# Patient Record
Sex: FEMALE | NOT HISPANIC OR LATINO
[De-identification: names, ages, dates, MRNs, and addresses within clinical notes are randomized per-mention and may not be internally consistent; named-entity substitution may affect disease eponyms.]

---

## 2018-03-20 ENCOUNTER — FORM ENCOUNTER (OUTPATIENT)
Age: 53
End: 2018-03-20

## 2018-12-26 ENCOUNTER — FORM ENCOUNTER (OUTPATIENT)
Age: 53
End: 2018-12-26

## 2019-03-12 ENCOUNTER — FORM ENCOUNTER (OUTPATIENT)
Age: 54
End: 2019-03-12

## 2020-03-12 ENCOUNTER — FORM ENCOUNTER (OUTPATIENT)
Age: 55
End: 2020-03-12

## 2020-04-28 ENCOUNTER — FORM ENCOUNTER (OUTPATIENT)
Age: 55
End: 2020-04-28

## 2020-09-22 ENCOUNTER — FORM ENCOUNTER (OUTPATIENT)
Age: 55
End: 2020-09-22

## 2021-01-19 PROBLEM — Z00.00 ENCOUNTER FOR PREVENTIVE HEALTH EXAMINATION: Status: ACTIVE | Noted: 2021-01-19

## 2021-03-19 ENCOUNTER — APPOINTMENT (OUTPATIENT)
Dept: BREAST CENTER | Facility: CLINIC | Age: 56
End: 2021-03-19

## 2021-07-23 PROBLEM — Z98.82 BREAST IMPLANT STATUS: Status: RESOLVED | Noted: 2021-01-21 | Resolved: 2021-07-23

## 2021-07-23 PROBLEM — Z86.39 HISTORY OF HYPOTHYROIDISM: Status: RESOLVED | Noted: 2021-07-23 | Resolved: 2021-07-23

## 2021-07-23 PROBLEM — N63.0 PALPABLE MASS OF BREAST: Status: RESOLVED | Noted: 2021-01-21 | Resolved: 2021-07-23

## 2021-07-23 PROBLEM — Z86.39 HISTORY OF HASHIMOTO THYROIDITIS: Status: RESOLVED | Noted: 2021-07-23 | Resolved: 2021-07-23

## 2021-07-23 RX ORDER — LEVOTHYROXINE SODIUM 137 UG/1
TABLET ORAL
Refills: 0 | Status: ACTIVE | COMMUNITY

## 2021-07-26 ENCOUNTER — APPOINTMENT (OUTPATIENT)
Dept: BREAST CENTER | Facility: CLINIC | Age: 56
End: 2021-07-26
Payer: COMMERCIAL

## 2021-07-26 VITALS
BODY MASS INDEX: 20.33 KG/M2 | HEIGHT: 65 IN | SYSTOLIC BLOOD PRESSURE: 107 MMHG | WEIGHT: 122 LBS | HEART RATE: 100 BPM | DIASTOLIC BLOOD PRESSURE: 78 MMHG

## 2021-07-26 DIAGNOSIS — Z98.82 BREAST IMPLANT STATUS: ICD-10-CM

## 2021-07-26 DIAGNOSIS — Z86.39 PERSONAL HISTORY OF OTHER ENDOCRINE, NUTRITIONAL AND METABOLIC DISEASE: ICD-10-CM

## 2021-07-26 DIAGNOSIS — N63.0 UNSPECIFIED LUMP IN UNSPECIFIED BREAST: ICD-10-CM

## 2021-07-26 DIAGNOSIS — Z78.9 OTHER SPECIFIED HEALTH STATUS: ICD-10-CM

## 2021-07-26 PROCEDURE — 99213 OFFICE O/P EST LOW 20 MIN: CPT

## 2021-07-26 PROCEDURE — 99072 ADDL SUPL MATRL&STAF TM PHE: CPT

## 2021-07-26 RX ORDER — SODIUM SULFATE, POTASSIUM SULFATE, MAGNESIUM SULFATE 17.5; 3.13; 1.6 G/ML; G/ML; G/ML
17.5-3.13-1.6 SOLUTION, CONCENTRATE ORAL
Qty: 354 | Refills: 0 | Status: DISCONTINUED | COMMUNITY
Start: 2021-05-06

## 2022-02-07 ENCOUNTER — NON-APPOINTMENT (OUTPATIENT)
Age: 57
End: 2022-02-07

## 2022-02-07 ENCOUNTER — APPOINTMENT (OUTPATIENT)
Dept: BREAST CENTER | Facility: CLINIC | Age: 57
End: 2022-02-07
Payer: COMMERCIAL

## 2022-02-07 VITALS
HEIGHT: 65 IN | BODY MASS INDEX: 20.33 KG/M2 | HEART RATE: 94 BPM | SYSTOLIC BLOOD PRESSURE: 113 MMHG | DIASTOLIC BLOOD PRESSURE: 74 MMHG | WEIGHT: 122 LBS

## 2022-02-07 PROCEDURE — 99213 OFFICE O/P EST LOW 20 MIN: CPT

## 2022-12-02 ENCOUNTER — APPOINTMENT (OUTPATIENT)
Dept: OTOLARYNGOLOGY | Facility: CLINIC | Age: 57
End: 2022-12-02

## 2022-12-02 VITALS — TEMPERATURE: 96 F | WEIGHT: 123 LBS | BODY MASS INDEX: 20.49 KG/M2 | HEIGHT: 65 IN

## 2022-12-02 DIAGNOSIS — Z87.09 PERSONAL HISTORY OF OTHER DISEASES OF THE RESPIRATORY SYSTEM: ICD-10-CM

## 2022-12-02 DIAGNOSIS — Z86.39 PERSONAL HISTORY OF OTHER ENDOCRINE, NUTRITIONAL AND METABOLIC DISEASE: ICD-10-CM

## 2022-12-02 DIAGNOSIS — Z72.89 OTHER PROBLEMS RELATED TO LIFESTYLE: ICD-10-CM

## 2022-12-02 DIAGNOSIS — Z87.01 PERSONAL HISTORY OF PNEUMONIA (RECURRENT): ICD-10-CM

## 2022-12-02 DIAGNOSIS — G47.8 OTHER SLEEP DISORDERS: ICD-10-CM

## 2022-12-02 DIAGNOSIS — Z80.9 FAMILY HISTORY OF MALIGNANT NEOPLASM, UNSPECIFIED: ICD-10-CM

## 2022-12-02 DIAGNOSIS — Z82.49 FAMILY HISTORY OF ISCHEMIC HEART DISEASE AND OTHER DISEASES OF THE CIRCULATORY SYSTEM: ICD-10-CM

## 2022-12-02 DIAGNOSIS — Z86.2 PERSONAL HISTORY OF DISEASES OF THE BLOOD AND BLOOD-FORMING ORGANS AND CERTAIN DISORDERS INVOLVING THE IMMUNE MECHANISM: ICD-10-CM

## 2022-12-02 DIAGNOSIS — H61.23 IMPACTED CERUMEN, BILATERAL: ICD-10-CM

## 2022-12-02 DIAGNOSIS — Z82.3 FAMILY HISTORY OF STROKE: ICD-10-CM

## 2022-12-02 PROCEDURE — 99203 OFFICE O/P NEW LOW 30 MIN: CPT | Mod: 25

## 2022-12-02 PROCEDURE — 69210 REMOVE IMPACTED EAR WAX UNI: CPT

## 2022-12-02 NOTE — HISTORY OF PRESENT ILLNESS
[de-identified] : SELVIN BERMAN is a 57 year old patient referred by Dr. Michelle for few ENT issues.  She has had fullness in the ears with occasional popping.  She sometimes has itching.  She was told she had wax buildup.  She tried OTC drops.  She has no otalgia, otorrhea, tinnitus, or dizziness.  She has occasional eustachian tube dysfunction when flying.  She has no history of recurrent middle ear infections or prior otologic surgery.  Her mother does have an acoustic neuroma.  \par \par She also has a history of chronic rhinitis.  She has right-sided nasal obstruction with bilateral congestion and postnasal drip.  She has no sinus pain or pressure.  She does not get recurrent sinus infections.  She does not have a history of allergies or reflux.  She had a nasal fracture when she was 13 years old.  She had a closed reduction.  She has tried Breathe Right strips which help somewhat.  She has not been on a nasal steroid spray.\par \par She also has a history of snoring.  It has been getting worse.  She has not had any significant change in her weight.  She does not believe she has gasping or choking for air.  If she sleeps longer hours she does feel better.  She has not had a sleep study.

## 2022-12-02 NOTE — CONSULT LETTER
[Dear  ___] : Dear  [unfilled], [Consult Letter:] : I had the pleasure of evaluating your patient, [unfilled]. [Please see my note below.] : Please see my note below. [Consult Closing:] : Thank you very much for allowing me to participate in the care of this patient.  If you have any questions, please do not hesitate to contact me. [Sincerely,] : Sincerely, [FreeTextEntry3] : Melisa Jackman MD\par

## 2022-12-02 NOTE — ASSESSMENT
[FreeTextEntry1] : She had cerumen impaction bilaterally which was removed.  Audiogram showed hearing within normal limits although she had a conductive component bilaterally.  She has a type C tympanogram on the right side indicating eustachian tube dysfunction.\par \par She has a history of chronic rhinitis.  She has nasal valve collapse, inferior turbinate hypertrophy, and a deviated septum.\par \par She has a history of snoring.  We discussed the possibility of sleep apnea\par \par Plan\par -Findings and management options were discussed with the patient.\par - The patient was asked to avoid sleeping on her back, eating before bed and drinking alcohol at night\par - Good sleep hygiene\par - Weight loss if she has gained weight\par - Elevation of the head of bed at night.\par - Sleep study to rule out obstructive sleep apnea.  We will discuss management options depending on this test results\par -Nasal saline irrigations, nasal steroid spray, and antihistamine or decongestant as needed\par -She may consider a burst of oral steroids depending on how she does\par -Breathe Right strips or nasal cones as needed.\par -She could consider nasal procedures such as septoplasty, inferior turbinate reduction, and nasal valve repair to improve her breathing\par -Consider allergy evaluation\par -Good ear hygiene reviewed\par -Noise precautions\par -Repeat audiogram in approximately 2 months\par -She was given literature regarding flying with eustachian tube dysfunction\par - Follow up after the sleep study\par - call and return earlier if any concerns.

## 2023-02-03 ENCOUNTER — APPOINTMENT (OUTPATIENT)
Dept: SLEEP CENTER | Facility: HOME HEALTH | Age: 58
End: 2023-02-03
Payer: COMMERCIAL

## 2023-02-03 ENCOUNTER — OUTPATIENT (OUTPATIENT)
Dept: OUTPATIENT SERVICES | Facility: HOSPITAL | Age: 58
LOS: 1 days | End: 2023-02-03
Payer: COMMERCIAL

## 2023-02-03 PROCEDURE — 95800 SLP STDY UNATTENDED: CPT

## 2023-02-03 PROCEDURE — 95800 SLP STDY UNATTENDED: CPT | Mod: 26

## 2023-02-06 DIAGNOSIS — G47.33 OBSTRUCTIVE SLEEP APNEA (ADULT) (PEDIATRIC): ICD-10-CM

## 2023-02-08 ENCOUNTER — APPOINTMENT (OUTPATIENT)
Dept: BREAST CENTER | Facility: CLINIC | Age: 58
End: 2023-02-08
Payer: COMMERCIAL

## 2023-02-08 ENCOUNTER — NON-APPOINTMENT (OUTPATIENT)
Age: 58
End: 2023-02-08

## 2023-02-08 ENCOUNTER — APPOINTMENT (OUTPATIENT)
Dept: HEMATOLOGY ONCOLOGY | Facility: CLINIC | Age: 58
End: 2023-02-08

## 2023-02-08 VITALS
DIASTOLIC BLOOD PRESSURE: 79 MMHG | HEART RATE: 78 BPM | BODY MASS INDEX: 20.83 KG/M2 | SYSTOLIC BLOOD PRESSURE: 114 MMHG | WEIGHT: 125 LBS | HEIGHT: 65 IN

## 2023-02-08 DIAGNOSIS — Z78.9 OTHER SPECIFIED HEALTH STATUS: ICD-10-CM

## 2023-02-08 PROCEDURE — 99213 OFFICE O/P EST LOW 20 MIN: CPT

## 2023-02-08 NOTE — PAST MEDICAL HISTORY
[History of Hormone Replacement Treatment] : has no history of hormone replacement treatment [de-identified] : [facility] 8/2020 [FreeTextEntry6] : No [FreeTextEntry7] : No [FreeTextEntry8] : Yes

## 2023-02-08 NOTE — HISTORY OF PRESENT ILLNESS
[FreeTextEntry1] : Patient is a 58 yo F who is here for breast cancer screening. Followed for fhx of breast cancer in paternal aunt (age 70s) and paternal great aunt (age 60's). She is BRCA 1/2 negative (not full panel, tested 2019). Patient denies palpable masses, skin changes, or nipple discharge bilaterally.\par \par YUKO Lifetime Risk- 14% \par \par 3/21/18: B/l MG & US- no radiologic evid of malignancy\par 3/13/19: B/l MG & US- simple cysts\par 3/13/20: B/l MG & US- R -WNL, L benign cysts and intramamm LN. \par 1/22/21: B/l MG & US- hetero dense, R- 1.5 cm benign appearing axillary lymph node c/w palpable area of concern, b/l simple cysts, BIRADS 2\par 2/7/22: B/l MG & US- US: L 0.4cm stable cyst BI- RADS 2 \par 2/8/23: B/l MG & US- heterogenously dense. LANE. BI-RADS 1

## 2023-02-08 NOTE — REVIEW OF SYSTEMS
[Fever] : no fever [Chills] : no chills [Shortness Of Breath] : no shortness of breath [Skin Lesions] : no skin lesions [Skin Wound] : no skin wound

## 2023-02-08 NOTE — PHYSICAL EXAM
[de-identified] : Bilateral breast/axilla/supraclavicular area: No masses, discharge, or adenopathy

## 2023-02-09 NOTE — DISCUSSION/SUMMARY
[FreeTextEntry1] : REASON FOR CONSULT\par Ramila Sen is a 57-year-old female referred by Dr. Jessika Herr for cancer genetic counseling and risk assessment due to a family history of kidney cancer, pancreatic cancer, melanoma, and breast cancer. Ms. Sen was seen on 2023, at which time medical and family history was ascertained and a pedigree constructed. \par \par RELEVANT MEDICAL HISTORY\par Ms. Sen is a healthy individual with no reported history of cancer. She has a family history of cancer, see below.\par \par Of note, Ms. Sen underwent genetic testing for the three Ashkenazi Hindu  mutations in the BRCA1 and BRCA2 genes in 2018 at [facility] as a part of the BFOR clinical study at [facility] (ordered by Dr. Manolo Cerda). Genetic testing results were negative. Report was available for review at today’s appointment. \par \par OTHER MEDICAL AND SURGICAL HISTORY:\par •	Medical History: renal angiomyolipoma (patient reports she undergoes annual kidney ultrasounds for monitoring), Hashimoto’s\par •	Surgical History: uterine polyp removal, broken nose repair (13 years old)\par \par OB/GYN HISTORY:\par Obstetrical History: \par Age at Menarche: 15\par Menopausal Status: Perimenopausal \par Age at First Live Birth: 35\par Oral Contraceptive Use: No\par Hormone Replacement Therapy: No\par \par CANCER SCREENING HISTORY:  \par Breast: \par •	Mammography: last 2023, normal\par •	Sonography: last 2023, normal\par •	MRI: No\par •	Biopsies: No\par GYN:\par •	Pelvic Examination: last 2022, reportedly normal\par •	Sonography: Patient reports undergoing TVUSes for 2 years from [id number] due to rapid fibroid growth. She reports that the results of the TVUSes were normal.\par •	CA-125: No\par Colon:\par •	Colonoscopy: last 3 years ago, reportedly normal. Next colonoscopy was recommended in 6 years. Patient denies a history of colorectal polyps on previous colonoscopies.\par •	Upper Endoscopy: last 5 years ago due to GERD, reportedly normal\par •	FOBT: No\par Skin:  \par •	FBSE: last summer 2022, reportedly normal\par •	Lesions biopsied/removed: No\par \par SOCIAL HISTORY:\par •	Tobacco-product use: No\par •	Environmental exposures: No\par \par FAMILY HISTORY:\par Maternal ancestry and paternal ancestry were reported as Chinese, Polish, and Ashkenazi Hindu. Consanguinity was denied. A detailed family history of cancer was ascertained, see below and scanned chart for pedigree. \par \par Ms. Sen’s brother was diagnosed with renal cancer at 22 years old at which time he pursued genetic testing as a part of a research study. She reports that his testing was negative at the time () and that he has not pursued updated genetic testing. Report was not available for review at today’s appointment. \par \par Ms. Sen’s son was diagnosed with CHRPE and pursued genetic testing for the APC gene. His results were reportedly negative. Report was not available for review at today’s appointment.\par 	\par 	RISK ASSESSMENT:\par Ms. Sen’s personal and family history is suggestive of a hereditary cancer syndrome given her renal angiomyolipoma, her brother’s renal cancer diagnosis at 22 years old, her maternal grandfather’s pancreatic cancer diagnosis at 70 years old, her paternal aunt’s breast cancer diagnosis at 70 years old and precancerous pancreatic lesion, her paternal aunt’s brain cancer diagnosis in her early 30’s, and her paternal grandfather’s colorectal cancer diagnosis in his 70’s all in the setting of Ashkenazi Hindu ancestry. The patient meets National Comprehensive Cancer Network (NCCN) criteria for genetic testing. We recommended genetic testing for genes associated with breast cancer, gynecological cancers, renal/urinary tract cancers, and pancreatic cancer. This test analyzes [42] genes: APC, KEILA, BAP1, BARD1, BMPR1A, BRCA1, BRCA2, BRIP1, CDC73, CDH1, CDKN1C, CDKN2A, CHEK2, DICER1, DIS3L2, EPCAM, FH, FLCN, GPC3, MEN1, MET, MLH1, MSH2, MSH6, NF1, PALB2, PMS2, PTEN, RAD51C, RAD51D, REST, SDHB, SDHC, SMAD4, SMARCA4, SMARCB1, STK11, TP53, TSC1, TSC2, VHL, and WT1.\par \par The risks, benefits and limitations of genetic testing were discussed with Ms. Sen. In addition, we discussed the purpose of genetic testing and possible test results (positive, negative, inconclusive) along with associated medical management options and psychosocial implications. Insurance coverage and potential out of pocket costs were also discussed. The Genetic Information Non-discrimination Act (CLAUDIA) was reviewed, and she was provided with written information regarding CLAUDIA. \par \par It was explained that risk assessment is based upon medical and family history as provided and may change in the future should new information be obtained. \par \par Following our discussion, Ms. Sen consented to the above-mentioned genetic testing panel. Blood was drawn in our laboratory and sent to [facility] today.\par \par PLAN:\par \par 1.	Blood drawn today will be sent to [facility] for analysis. \par 2.	We will contact Ms. Sen to schedule a follow-up appointment once the results are available. Results generally return in 2-3 weeks. \par \par For any additional questions please call Cancer Genetics at [phone]. \par \par Wendy Syed MS, [facility]\par Genetic Counselor, Cancer Genetics\par

## 2023-02-17 ENCOUNTER — APPOINTMENT (OUTPATIENT)
Dept: OTOLARYNGOLOGY | Facility: CLINIC | Age: 58
End: 2023-02-17
Payer: COMMERCIAL

## 2023-02-17 VITALS — BODY MASS INDEX: 20.49 KG/M2 | WEIGHT: 123 LBS | HEIGHT: 65 IN

## 2023-02-17 PROCEDURE — 99214 OFFICE O/P EST MOD 30 MIN: CPT

## 2023-02-17 NOTE — ASSESSMENT
[FreeTextEntry1] : Has a history of snoring.  She thinks a lot of the noises coming from her nose.  Sleep study did not show significant sleep apnea.  We reviewed the results of her sleep study\par \par She has right eustachian tube dysfunction.  Her ears were normal on exam.  She had a type A tympanogram on the right side today.\par \par She has chronic rhinitis with nasal valve collapse, inferior turbinate hypertrophy, and a deviated septum.\par \par Plan\par -Findings and management options were discussed with the patient.\par -She should avoid sleeping on her back, eating before bed and drinking alcohol at night\par - Good sleep hygiene\par -I spoke with her about treatment options for snoring.  I recommended positional changes.  She may consider trying an oral appliance but she may not tolerate it due to her TMJ dysfunction.  She may speak with her dentist about an appliance that can treat both.  She could consider nasal procedures.  That may improve the breathing her nose but is difficult to say how much it would help the snoring.  She could consider procedures for snoring.  If she is interested, I recommend evaluation with my colleague, Dr. Monzon, who performs office-based procedures for snoring\par -Nasal saline irrigations, nasal steroid spray, and antihistamine or decongestant as needed\par -Nasal cones as needed\par -She may consider allergy evaluation\par -I spoke with her about procedures for nasal obstruction.  She may consider an office procedure such as inferior turbinate reduction and nasal valve rescuplting.  They gave her literature regarding the VIvaer procedure.  She may also consider septoplasty with inferior turbinate reduction and nasal valve repair in the operating room.  She is going to think this over.  It would help with the nasal obstruction.  It is difficult to say how much it would help with the snoring\par -Continue good ear hygiene\par -Monitor hearing\par -We discussed management options for eustachian tube dysfunction.  I discussed diagnostic myringotomy, myringotomy with tube insertion, and eustachian tube balloon dilation.  She is going to think this over.  Since her middle ear pressure was normal on exam today, I suggested considering a myringotomy to see if she feels significantly better.  I discussed the risk/benefits of it.  She will think it over.\par -She may also see if the nasal steroid spray and the antihistamine or decongestant help with the ear pressure.  She can also try a short burst of prednisone\par -I will see her back in approximate 4 weeks\par - call and return earlier if any concerns.

## 2023-02-17 NOTE — HISTORY OF PRESENT ILLNESS
[de-identified] : SELVIN BERMAN is a 57 year old patient here to review her sleep study.  She also has history of chronic rhinitis with deviated nasal septum, inferior turbinate perjury, and nasal valve collapse.  She also has eustachian tube dysfunction\par \par She has a history of snoring.  Sleep study did not show significant sleep apnea.  AHI 4%–2.8, AHI 3%–5.6.  Her lowest oxygen saturation was 89%.\par \par She has a history of chronic rhinitis.  She did not feel the Breathe Right strips helped.  She has been trying nasal cones.  She is not sure how much it is helping.  She does have nasal congestion and obstruction which is worse on the right side\par \par She has right eustachian tube dysfunction.  She had a type C tympanogram on her audiogram with a small air-bone gap.  She still has intermittent ear pressure.\par \par ENT history\par She has chronic rhinitis.\par She does not have recurrent sinus infections\par She has not had allergy testing\par She had a nasal fracture when she was 13 years old and may have had a closed reduction\par \par She has right eustachian tube dysfunction\par No history of recurrent middle ear infections or prior otologic surgery\par Her mother has a history of an acoustic neuroma\par \par She has TMJ dysfunction and uses a nightguard\par

## 2023-02-17 NOTE — CONSULT LETTER
[Dear  ___] : Dear  [unfilled], [Courtesy Letter:] : I had the pleasure of seeing your patient, [unfilled], in my office today. [Please see my note below.] : Please see my note below. [Consult Closing:] : Thank you very much for allowing me to participate in the care of this patient.  If you have any questions, please do not hesitate to contact me. [Sincerely,] : Sincerely, [FreeTextEntry3] : Melisa Jackman MD\par

## 2023-02-24 ENCOUNTER — NON-APPOINTMENT (OUTPATIENT)
Age: 58
End: 2023-02-24

## 2023-02-24 NOTE — DISCUSSION/SUMMARY
[FreeTextEntry1] : REASON FOR CONSULT\par Ramila Sen is a 57-year-old female who was contacted on 02/24/2023 for a discussion regarding her negative genetic testing results related to hereditary cancer predisposition. This session was conducted via telephone. \par \par Ms. Sen was originally seen by the Cancer Genetics Service on 02/08/2023 for hereditary cancer predisposition risk assessment due to a family history of kidney cancer, pancreatic cancer, melanoma, and breast cancer. At that time, Ms. Sen decided to pursue genetic testing for genes associated with breast cancer, gynecological cancers, renal/urinary tract cancers, and pancreatic cancer offered by [facility].\par \par TEST RESULTS: NEGATIVE\par NO pathogenic (disease-causing) variants or variants of uncertain significance were detected in any of the following genes [42]:  APC, KEILA, BAP1, BARD1, BMPR1A, BRCA1, BRCA2, BRIP1, CDC73, CDH1, CDKN1C, CDKN2A, CHEK2, DICER1, DIS3L2, EPCAM, FH, FLCN, GPC3, MEN1, MET, MLH1, MSH2, MSH6, NF1, PALB2, PMS2, PTEN, RAD51C, RAD51D, REST, SDHB, SDHC, SMAD4, SMARCA4, SMARCB1, STK11, TP53, TSC1, TSC2, VHL, and WT1.\par \par RESULTS INTERPRETATION AND ASSESSMENT:\par Given Ms. Sen’s personal medical history and current reported family history of cancer, and her negative genetic test results, the following screening guidelines and risk-reducing recommendations were discussed:\par \par KIDNEY: \par •	Ms. Sen reports a personal medical history of a renal angiomyolipoma for which she is followed by a urologist. Due to her personal medical history and her family history of kidney cancer in her brother (diagnosed at 22 years old), we requested that Ms. Sen obtain pathology records from her brother’s kidney cancer diagnosis to help us better interpret her genetic testing results.\par \par OTHER:\par •	In the absence of other indications, Ms. Sen should practice age-appropriate cancer screening of other organ systems as recommended for the general population.\par \par We also discussed the limitations of negative results:\par 1.	The cause of Ms. Sen’s family history of cancer remains unknown. The cancer(s) may have developed randomly, or due to environmental factors.  \par 2.	This negative result does not completely rule out a hereditary basis for the reported personal and/or family history due to limitations in technology or a variant being present in an unidentified gene. \par 3.	Variants in other genes would not be identified by this analysis, so this negative result does not rule out the likelihood of having a mutation in a different hereditary cancer gene or the possibility of ever developing cancer.\par 4.	It is possible there is a hereditary cancer predisposition gene mutation in the family, but the patient did not inherit it. \par \par We informed Ms. Sen that our knowledge of genetics and inherited cancer conditions is changing rapidly. Therefore, we recommended that Ms. Sen contact our office, every 2 to 3 years, to discuss relevant advances in cancer genetics.  We emphasized the importance of re-contacting us with updates regarding her personal and family history of cancer as well as any updates regarding additional cancer genetic test results performed for the patient and/or family members.  Such updates could possibly change our risk assessment and recommendations. \par \par In addition, we discussed that Ms. Sen’s brother consider pursuing cancer risk assessment genetic counseling with the option of updated genetic testing. \par \par PLAN:\par 1.	These results do not change Ms. Sen’s medical management. \par 2.	Ms. Sen was encouraged to obtain pathology records from her brother’s kidney cancer diagnosis and share them with Cancer Genetics to better inform the interpretation of her genetic test results. \par 3.	Patient informed consult note(s) will be available through their [facility] patient portal and genetic test results will be released via [facility]’s portal.\par 4.	Ms. Sen was encouraged to contact us every 2-3 years to discuss relevant advances in cancer genetics, or sooner if there are any changes in her personal or family history of cancer.\par \par For any additional questions please call Cancer Genetics at [phone]. \par \par Wendy Syed MS, [facility]\par Genetic Counselor, Cancer Genetics\par

## 2023-04-25 ENCOUNTER — APPOINTMENT (OUTPATIENT)
Dept: OTOLARYNGOLOGY | Facility: CLINIC | Age: 58
End: 2023-04-25

## 2023-10-06 ENCOUNTER — APPOINTMENT (OUTPATIENT)
Dept: OTOLARYNGOLOGY | Facility: CLINIC | Age: 58
End: 2023-10-06
Payer: COMMERCIAL

## 2023-10-06 PROCEDURE — 99442: CPT

## 2023-11-03 ENCOUNTER — APPOINTMENT (OUTPATIENT)
Dept: OTOLARYNGOLOGY | Facility: CLINIC | Age: 58
End: 2023-11-03
Payer: COMMERCIAL

## 2023-11-03 VITALS — HEIGHT: 65 IN | WEIGHT: 123 LBS | BODY MASS INDEX: 20.49 KG/M2

## 2023-11-03 DIAGNOSIS — M95.0 ACQUIRED DEFORMITY OF NOSE: ICD-10-CM

## 2023-11-03 DIAGNOSIS — H93.293 OTHER ABNORMAL AUDITORY PERCEPTIONS, BILATERAL: ICD-10-CM

## 2023-11-03 DIAGNOSIS — R06.83 SNORING: ICD-10-CM

## 2023-11-03 DIAGNOSIS — J31.0 CHRONIC RHINITIS: ICD-10-CM

## 2023-11-03 DIAGNOSIS — J34.2 DEVIATED NASAL SEPTUM: ICD-10-CM

## 2023-11-03 PROCEDURE — 69210 REMOVE IMPACTED EAR WAX UNI: CPT

## 2023-11-03 PROCEDURE — 31231 NASAL ENDOSCOPY DX: CPT

## 2023-11-03 PROCEDURE — 99213 OFFICE O/P EST LOW 20 MIN: CPT | Mod: 25

## 2023-11-03 PROCEDURE — 92567 TYMPANOMETRY: CPT

## 2023-11-03 PROCEDURE — 92557 COMPREHENSIVE HEARING TEST: CPT

## 2023-11-15 PROBLEM — H93.293 ABNORMAL AUDITORY PERCEPTION OF BOTH EARS: Status: ACTIVE | Noted: 2023-11-15

## 2024-02-05 ENCOUNTER — APPOINTMENT (OUTPATIENT)
Dept: OTOLARYNGOLOGY | Facility: CLINIC | Age: 59
End: 2024-02-05
Payer: COMMERCIAL

## 2024-02-05 DIAGNOSIS — H69.91 UNSPECIFIED EUSTACHIAN TUBE DISORDER, RIGHT EAR: ICD-10-CM

## 2024-02-05 DIAGNOSIS — H90.3 SENSORINEURAL HEARING LOSS, BILATERAL: ICD-10-CM

## 2024-02-05 PROCEDURE — 99442: CPT

## 2024-02-05 NOTE — HISTORY OF PRESENT ILLNESS
[de-identified] : Telehealth (telephone) visit.  There are limitations of telemedicine encounters including the risks associated with the technology platform and lack of a physical exam.  The patient may need further testing and work up to arrive at a diagnosis and treatment plan.  The patient was made aware when the appointment was scheduled that this will be billed as a visit.  The patient understood and elected to proceed.  SELVIN BERMAN is a 58 year old patient With a history of right ear pressure and eustachian tube dysfunction.  She was found to have a possible asymmetry on audiogram.  It was unclear if the asymmetry was due to eustachian tube dysfunction as she did have a type C tympanogram or a mild asymmetric sensorineural hearing loss.  There is a family history of an acoustic neuroma.  We had discussed workup and treatment for.  We discussed obtaining an MRI with contrast of the IACs.  She has had several questions about this.  She is not sure that she wishes to have the contrast.  Her symptoms remain unchanged.   ENT history She has chronic rhinitis, deviated septum, inferior turbinate hypertrophy, and nasal valve collapse. She does not have recurrent sinus infections She has not had allergy testing She had a nasal fracture when she was 13 years old and may have had a closed reduction  She has right eustachian tube dysfunction No history of recurrent middle ear infections or prior otologic surgery Her mother has a history of an acoustic neuroma  She has TMJ dysfunction and uses a nightguard  She has a history of snoring. Sleep study did not show significant sleep apnea. AHI 4%-2.8, AHI 3%-5.6. Her lowest oxygen saturation was 89%.

## 2024-02-05 NOTE — ASSESSMENT
[FreeTextEntry1] : She has a history of right ear pressure and eustachian tube dysfunction.  On audiogram, she had a type C tympanogram.  She also had a possible mild asymmetry in the right ear although it was unclear if it could be due to the eustachian tube dysfunction.    Plan -Findings and management options were discussed with the patient. -We again discussed workup for a possible asymmetric sensorineural hearing loss.  Her options are observation, ABR, and MRI.  If she wishes to get the MRI without contrast, it is possible and may miss small lesions.  We discussed the risks and benefits of each option.  She is going to think this over -I again also reviewed management for eustachian tube dysfunction.  Her options include observation, myringotomy with or without tube insertion, and possible eustachian tube balloon dilation.  We again discussed possible diagnostic myringotomy. -She is going to think over her options and let me know if she would like to proceed with the MRI.-If she opts for observation, I recommend follow-up in 6 months or earlier if needed

## 2024-02-06 PROBLEM — N64.9 DISORDER OF BREAST, UNSPECIFIED: Status: ACTIVE | Noted: 2021-07-23

## 2024-02-12 ENCOUNTER — APPOINTMENT (OUTPATIENT)
Dept: BREAST CENTER | Facility: CLINIC | Age: 59
End: 2024-02-12
Payer: COMMERCIAL

## 2024-02-12 VITALS
BODY MASS INDEX: 20.49 KG/M2 | HEART RATE: 76 BPM | SYSTOLIC BLOOD PRESSURE: 119 MMHG | HEIGHT: 65 IN | DIASTOLIC BLOOD PRESSURE: 84 MMHG | WEIGHT: 123 LBS

## 2024-02-12 DIAGNOSIS — Z80.3 FAMILY HISTORY OF MALIGNANT NEOPLASM OF BREAST: ICD-10-CM

## 2024-02-12 DIAGNOSIS — Z12.39 ENCOUNTER FOR OTHER SCREENING FOR MALIGNANT NEOPLASM OF BREAST: ICD-10-CM

## 2024-02-12 DIAGNOSIS — N64.9 DISORDER OF BREAST, UNSPECIFIED: ICD-10-CM

## 2024-02-12 PROCEDURE — 99214 OFFICE O/P EST MOD 30 MIN: CPT

## 2024-02-12 NOTE — PAST MEDICAL HISTORY
[History of Hormone Replacement Treatment] : has no history of hormone replacement treatment [de-identified] : [facility] 8/2020 [FreeTextEntry6] : No [FreeTextEntry7] : No [FreeTextEntry8] : Yes

## 2024-02-12 NOTE — PHYSICAL EXAM
[de-identified] : Bilateral breast/axilla/supraclavicular area: No masses, discharge, or adenopathy

## 2024-02-12 NOTE — HISTORY OF PRESENT ILLNESS
[FreeTextEntry1] : Patient is a 57 yo F who is here for breast cancer screening. Followed for fhx of breast cancer in paternal aunt (age 70s) and paternal great aunt (age 60's). She is BRCA 1/2 negative (re-tested in 2023). Patient denies palpable masses, skin changes, or nipple discharge bilaterally.  YUKO Lifetime Risk- 14%   3/21/18: B/l MG & US- no radiologic evid of malignancy 3/13/19: B/l MG & US- simple cysts 3/13/20: B/l MG & US- R -WNL, L benign cysts and intramamm LN. 1/22/21: B/l MG & US- hetero dense, R- 1.5 cm benign appearing axillary LN c/w palpable area of concern, b/l simple cysts, BIRADS 2 2/7/22: B/l MG & US- US: L 0.4cm stable cyst BI- RADS 2  2/8/23: B/l MG & US- heterogenously dense. LANE. BI-RADS 1 2/12/24: B/L MG & US- heterogeneously dense, R stable 0.6 cm mass lower inner, US- R 0.6 cm cluster of cysts 5:00 6FN (c/w MG), R morphologically benign 0.7 cm LN 10:00 7FN, LANE. BIRADS 2.

## 2025-02-10 PROBLEM — R92.30 DENSE BREASTS: Status: ACTIVE | Noted: 2025-02-10

## 2025-02-13 ENCOUNTER — NON-APPOINTMENT (OUTPATIENT)
Age: 60
End: 2025-02-13

## 2025-02-13 ENCOUNTER — APPOINTMENT (OUTPATIENT)
Dept: BREAST CENTER | Facility: CLINIC | Age: 60
End: 2025-02-13
Payer: COMMERCIAL

## 2025-02-13 VITALS
WEIGHT: 113 LBS | SYSTOLIC BLOOD PRESSURE: 114 MMHG | BODY MASS INDEX: 19.53 KG/M2 | DIASTOLIC BLOOD PRESSURE: 79 MMHG | HEART RATE: 80 BPM | HEIGHT: 63.75 IN

## 2025-02-13 DIAGNOSIS — R92.30 DENSE BREASTS, UNSPECIFIED: ICD-10-CM

## 2025-02-13 PROCEDURE — 99213 OFFICE O/P EST LOW 20 MIN: CPT

## 2025-02-13 RX ORDER — MULTIVIT-MIN/IRON/FOLIC ACID/K 18-600-40
CAPSULE ORAL
Refills: 0 | Status: ACTIVE | COMMUNITY

## 2025-02-13 RX ORDER — UBIDECARENONE/VIT E ACET 100MG-5
CAPSULE ORAL
Refills: 0 | Status: ACTIVE | COMMUNITY

## 2025-02-13 RX ORDER — B-COMPLEX WITH VITAMIN C
TABLET ORAL
Refills: 0 | Status: ACTIVE | COMMUNITY

## 2025-02-13 RX ORDER — SELENIUM 50 MCG
TABLET ORAL
Refills: 0 | Status: ACTIVE | COMMUNITY

## 2025-02-13 RX ORDER — PSYLLIUM HUSK 0.4 G
CAPSULE ORAL
Refills: 0 | Status: ACTIVE | COMMUNITY